# Patient Record
Sex: MALE | Race: WHITE | URBAN - METROPOLITAN AREA
[De-identification: names, ages, dates, MRNs, and addresses within clinical notes are randomized per-mention and may not be internally consistent; named-entity substitution may affect disease eponyms.]

---

## 2017-07-10 ENCOUNTER — APPOINTMENT (OUTPATIENT)
Dept: GENERAL RADIOLOGY | Facility: CLINIC | Age: 37
End: 2017-07-10
Attending: PHYSICIAN ASSISTANT
Payer: COMMERCIAL

## 2017-07-10 ENCOUNTER — HOSPITAL ENCOUNTER (EMERGENCY)
Facility: CLINIC | Age: 37
Discharge: HOME | End: 2017-07-10
Attending: PHYSICIAN ASSISTANT | Admitting: PHYSICIAN ASSISTANT
Payer: COMMERCIAL

## 2017-07-10 VITALS
WEIGHT: 220 LBS | SYSTOLIC BLOOD PRESSURE: 143 MMHG | RESPIRATION RATE: 18 BRPM | BODY MASS INDEX: 31.57 KG/M2 | OXYGEN SATURATION: 98 % | TEMPERATURE: 97.8 F | DIASTOLIC BLOOD PRESSURE: 76 MMHG

## 2017-07-10 DIAGNOSIS — S62.001A CLOSED DISPLACED FRACTURE OF SCAPHOID OF RIGHT WRIST, UNSPECIFIED PORTION OF SCAPHOID, INITIAL ENCOUNTER: ICD-10-CM

## 2017-07-10 PROCEDURE — 99213 OFFICE O/P EST LOW 20 MIN: CPT

## 2017-07-10 PROCEDURE — 73110 X-RAY EXAM OF WRIST: CPT | Mod: RT

## 2017-07-10 PROCEDURE — 99213 OFFICE O/P EST LOW 20 MIN: CPT | Performed by: PHYSICIAN ASSISTANT

## 2017-07-10 NOTE — ED PROVIDER NOTES
History     Chief Complaint   Patient presents with     Wrist Pain     fell on Sat, still having pain     HPI  Michael Arreola is a 37 year old male who sustained a right wrist injury 3 days ago.   Mechanism of injury: contusion. Patient was getting out of a boat and slipped on some hart covered rocks landing on right wrist.   Immediate symptoms: immediate pain, immediate swelling, was able to use arm directly after injury, was able to use hand directly after injury, no deformity was noted by the patient.   Symptoms have been sudden, unchanged since that time.   Prior history of related problems: no prior problems with this area in the past. Patient broke left scaphoid bone in the past.     Patient denies numbness, abrasions/lacerations, weakness in the wrist or hand. No elbow pain. No bruising.   Positive swelling to right hand with tingling noted to hand.   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     Problem list, Medication list, Allergies, and Medical/Social/Surgical histories reviewed in [facility] and updated as appropriate.    Review of Systems     All normal unless stated above     Physical Exam   BP: 143/76  Heart Rate: 91  Temp: 97.8  F (36.6  C)  Resp: 18  Weight: 99.8 kg (220 lb)  SpO2: 98 %  Physical Exam     Constitutional: healthy, alert and no distress   Cardiovascular: RRR. No murmurs, clicks gallops or rub  Respiratory: Lungs clear to auscultation bilaterally. No rales, rhonchi, wheezing appreciated. No accessory muscle use or retractions.   NEURO: Gait normal. Reflexes normal and symmetric. Sensation grossly WNL.  SKIN: no suspicious lesions or rashes  JOINT/EXTREMITIES: extremities normal- no gross deformities noted, normal muscle tone, mild edema to the right hand and wrist, peripheral pulses normal, decreased range of motion in right hand and wrist due to swelling and pain in snuff box region, and tender to palpation over the scaphoid bone.     No results found for this or any  previous visit (from the past 24 hour(s)).    Patient states he has the exact same universal thumb splint at home for the right wrist and declined our splint in the office today. Patient was informed to go home and put splint on right away and keep it on until seen by ortho. Patient aware and understands.     ED Course     ED Course     Procedures            Critical Care time:  none               Labs Ordered and Resulted from Time of ED Arrival Up to the Time of Departure from the ED - No data to display    Assessments & Plan (with Medical Decision Making)     I have reviewed the nursing notes.    I have reviewed the findings, diagnosis, plan and need for follow up with the patient.       Discharge Medication List as of 7/10/2017  1:51 PM          Final diagnoses:   Closed displaced fracture of scaphoid of right wrist, unspecified portion of scaphoid, initial encounter - minimally displaced       7/10/2017   [facility] EMERGENCY DEPARTMENT     Luz Elena Sorenson PA-C  07/10/17 1352       Luz Elena Sorenson PA-C  07/10/17 1400

## 2017-07-10 NOTE — LETTER
[facility] EMERGENCY DEPARTMENT  [address] [address]  [phone]  Dept: [phone]      7/10/2017    Re: Michael TRAY Arreola      TO WHOM IT MAY CONCERN:    Michael Arreola  was seen on 7/10/17.  Please excuse him from work until seen by orthopedic specialist due to injury.    Cordially    Luz Elena Sorenson PA-C  [facility] EMERGENCY DEPARTMENT

## 2017-07-10 NOTE — DISCHARGE INSTRUCTIONS
Rest and elevate the affected painful area as much as possible.    Apply ice for 15-20 minutes intermittently as needed and especially after any offending activity.   Use splint/crutches as directed  Daily stretching to prevent stiffness of joint if no diagnosed fracture diagnosed.  As pain recedes, begin normal activities slowly as tolerated.    Anti-inflammatories/pain relievers like tylenol/acetaminophen or ibuprofen can be very helpful if not allergic to or contraindicated.       Follow up with Orthopedic specialist for further evaluation and treatment; referral given

## 2017-07-10 NOTE — ED NOTES
Patient here for pain in the right wrist, symptoms started Saturday [place].  Patient presents ambulatory to the urgent care.

## 2017-07-10 NOTE — ED AVS SNAPSHOT
[facility] Emergency Department    [address] [address]    Phone:  [phone]    Fax:  [phone]                                       Michael Arreola   MRN: [id number]    Department:  [facility] Emergency Department   Date of Visit:  7/10/2017           Patient Information     Date Of Birth          1980        Your diagnoses for this visit were:     Closed displaced fracture of scaphoid of right wrist, unspecified portion of scaphoid, initial encounter minimally displaced       You were seen by Luz Elena Sorenson PA-C.        Discharge Instructions       Rest and elevate the affected painful area as much as possible.    Apply ice for 15-20 minutes intermittently as needed and especially after any offending activity.   Use splint/crutches as directed  Daily stretching to prevent stiffness of joint if no diagnosed fracture diagnosed.  As pain recedes, begin normal activities slowly as tolerated.    Anti-inflammatories/pain relievers like tylenol/acetaminophen or ibuprofen can be very helpful if not allergic to or contraindicated.       Follow up with Orthopedic specialist for further evaluation and treatment; referral given           24 Hour Appointment Hotline       To make an appointment at any [facility] clinic, call [phone] ([phone]). If you don't have a family doctor or clinic, we will help you find one. [facility] clinics are conveniently located to serve the needs of you and your family.          ED Discharge Orders     ORTHO  REFERRAL       [facility] is referring you to the Orthopedic  Services at [facility].       The [facility] Representative will assist you in the coordination of your Orthopedic and Musculoskeletal Care as prescribed by your physician.    The  Representative will call you within 1 business day to help schedule your appointment, or you may contact the [facility]  Representative at:    All areas ~ [phone]     Type of Referral : Non Surgical       Timeframe requested: 3 - 5 days    Coverage of these services is subject to the terms and limitations of your health insurance plan.  Please call member services at your health plan with any benefit or coverage questions.      If X-rays, CT or MRI's have been performed, please contact the facility where they were done to arrange for , prior to your scheduled appointment.  Please bring this referral request to your appointment and present it to your specialist.            Titan Wrist/Thumb Universal       Right                     Review of your medicines      Our records show that you are taking the medicines listed below. If these are incorrect, please call your family doctor or clinic.        Dose / Directions Last dose taken    omeprazole 20 MG CR capsule   Commonly known as:  priLOSEC   Dose:  20 mg   Quantity:  30 capsule        Take 1 capsule (20 mg) by mouth daily   Refills:  11                Procedures and tests performed during your visit     Wrist XR, G/E 3 views, right      Orders Needing Specimen Collection     None      Pending Results     No orders found from 7/8/2017 to 7/11/2017.            Pending Culture Results     No orders found from 7/8/2017 to 7/11/2017.            Pending Results Instructions     If you had any lab results that were not finalized at the time of your Discharge, you can call the ED Lab Result RN at [phone]. You will be contacted by this team for any positive Lab results or changes in treatment. The nurses are available 7 days a week from 10A to 6:30P.  You can leave a message 24 hours per day and they will return your call.        Test Results From Your Hospital Stay        7/10/2017  1:41 PM      Narrative     XR WRIST RT G/E 3 VW 7/10/2017 1:19 PM    COMPARISON: None.    HISTORY: Fall onto wrist with pain at the scaphoid.        Impression     IMPRESSION: There appears to  "be a minimally displaced fracture of the  mid scaphoid, seen on the oblique and scaphoid projections. No other  fractures are seen in the right wrist. Joint spaces are preserved and  in normal alignment.    LUIS BUSTOS                Thank you for choosing [facility]       Thank you for choosing [facility] for your care. Our goal is always to provide you with excellent care. Hearing back from our patients is one way we can continue to improve our services. Please take a few minutes to complete the written survey that you may receive in the mail after you visit with us. Thank you!        Lasso Information     Lasso lets you send messages to your doctor, view your test results, renew your prescriptions, schedule appointments and more. To sign up, go to [email or web address] . Click on \"Log in\" on the left side of the screen, which will take you to the Welcome page. Then click on \"Sign up Now\" on the right side of the page.     You will be asked to enter the access code listed below, as well as some personal information. Please follow the directions to create your username and password.     Your access code is: [email or web address]  Expires: 10/8/2017  1:50 PM     Your access code will  in 90 days. If you need help or a new code, please call your [facility] clinic or [phone].        Care EveryWhere ID     This is your Care EveryWhere ID. This could be used by other organizations to access your [facility] medical records  [id number]        Equal Access to Services     TONNY ALEJANDRA : Hadii scotty mariono Sokris, waaxda luqadaha, qaybta kaalmada adeegyada, johnna vicente . So [facility] [phone].    ATENCIÓN: Si habla español, tiene a reaves disposición servicios gratuitos de asistencia lingüística. Llame al [phone].    We comply with applicable federal civil rights laws and [place] laws. We do not discriminate on the basis of race, color, national origin, age, disability sex, sexual " orientation or gender identity.            After Visit Summary       This is your record. Keep this with you and show to your community pharmacist(s) and doctor(s) at your next visit.

## 2017-07-10 NOTE — ED AVS SNAPSHOT
[facility] Emergency Department    [address] [address]    Phone:  [phone]    Fax:  [phone]                                       Michael Arreola   MRN: [id number]    Department:  [facility] Emergency Department   Date of Visit:  7/10/2017           After Visit Summary Signature Page     I have received my discharge instructions, and my questions have been answered. I have discussed any challenges I see with this plan with the nurse or doctor.    ..........................................................................................................................................  Patient/Patient Representative Signature      ..........................................................................................................................................  Patient Representative Print Name and Relationship to Patient    ..................................................               ................................................  Date                                            Time    ..........................................................................................................................................  Reviewed by Signature/Title    ...................................................              ..............................................  Date                                                            Time

## 2017-07-11 ENCOUNTER — OFFICE VISIT (OUTPATIENT)
Dept: ORTHOPEDICS | Facility: CLINIC | Age: 37
End: 2017-07-11
Payer: COMMERCIAL

## 2017-07-11 VITALS — WEIGHT: 215 LBS | HEART RATE: 72 BPM | BODY MASS INDEX: 30.78 KG/M2 | HEIGHT: 70 IN

## 2017-07-11 DIAGNOSIS — S62.024A CLOSED NONDISPLACED FRACTURE OF MIDDLE THIRD OF SCAPHOID BONE OF RIGHT WRIST, INITIAL ENCOUNTER: Primary | ICD-10-CM

## 2017-07-11 PROCEDURE — 29075 APPL CST ELBW FNGR SHORT ARM: CPT | Performed by: PHYSICAL MEDICINE & REHABILITATION

## 2017-07-11 PROCEDURE — 99244 OFF/OP CNSLTJ NEW/EST MOD 40: CPT | Mod: 25 | Performed by: PHYSICAL MEDICINE & REHABILITATION

## 2017-07-11 NOTE — MR AVS SNAPSHOT
After Visit Summary   2017    Michael Arreola    MRN: [id number]           Patient Information     Date Of Birth          1980        Visit Information        Provider Department      2017 2:00 PM Simi Baugh MD [facility] [employer] [facility]        Today's Diagnoses     Closed nondisplaced fracture of middle third of scaphoid bone of right wrist, initial encounter    -  1      Care Instructions    Today's Plan of Care:  -Cast care as outlined below  -Schedule CT - Advanced Imaging [phone]. Come in before imaging appt for cast removal    Follow Up: 6 weeks or sooner if symptoms fail to improve or worsen. Call with any questions or concerns.          Caring for Your Cast     A cast is used to protect an injured body part and allow it to heal by limiting the amount of motion occurring around the injury. Pain and swelling of the injured area is normal for 48 hours after your cast is put on. If you have swelling, wiggle your toes or fingers to ease it. Doing so encourages blood flow to your arm or leg.     It is important that you keep your cast dry, unless your doctor tells you differently. If the padding of the cast gets wet, your skin may be damaged and become infected. When showering or taking a bath, put the cast in a heavy plastic bag that can be held in place with a rubber band. If your cast gets wet and does not dry out in four to five hours, call your doctor s office.   To keep the cast clean, use wash clothes or baby wipes around it.   You may experience some itching inside the cast. This is normal. Avoid putting anything in the cast, even your finger, as you can injure your skin and cause infection. Try shaking some talcum powder or blowing cool air from a hair dryer into the cast to ease itching.   If these signs or symptoms develop, call your doctor immediately.       Pain gets worse     Swelling that cuts off blood flow that does not go away, even when  "you lift the body part above the level of your heart     Fever after itching. It may be related to an infection.     Fluid draining from your skin under the cast     Your cast may become loose as swelling goes down. If the cast feels too loose or if it is so loose you can take it off, call your doctor s office.     Your doctor or  will give you recommendations for activity based on your injury. Some sports allow casts if properly padded by a doctor or .     For complete healing, your cast should only be removed at the direction of your doctor or clinic staff. A special saw ensures its safe removal and protects the skin and other tissue under the cast.             Follow-ups after your visit        Future tests that were ordered for you today     Open Future Orders        Priority Expected Expires Ordered    CT Wrist Right w/o Contrast Routine  7/11/2018 7/11/2017            Who to contact     If you have questions or need follow up information about today's clinic visit or your schedule please contact [facility] directly at [phone].  Normal or non-critical lab and imaging results will be communicated to you by ViaBillhart, letter or phone within 4 business days after the clinic has received the results. If you do not hear from us within 7 days, please contact the clinic through ViaBillhart or phone. If you have a critical or abnormal lab result, we will notify you by phone as soon as possible.  Submit refill requests through [facility]mark or call your pharmacy and they will forward the refill request to us. Please allow 3 business days for your refill to be completed.          Additional Information About Your Visit        [facility] Information     [facility] lets you send messages to your doctor, view your test results, renew your prescriptions, schedule appointments and more. To sign up, go to [email or web address] . Click on \"Log in\" on the left side of the screen, which will " "take you to the Welcome page. Then click on \"Sign up Now\" on the right side of the page.     You will be asked to enter the access code listed below, as well as some personal information. Please follow the directions to create your username and password.     Your access code is: [email or web address]  Expires: 10/8/2017  1:50 PM     Your access code will  in 90 days. If you need help or a new code, please call your [facility] clinic or [phone].        Care EveryWhere ID     This is your Care EveryWhere ID. This could be used by other organizations to access your [facility] medical records  [id number]        Your Vitals Were     Pulse Height BMI (Body Mass Index)             72 5' 9.69\" (1.77 m) 31.13 kg/m2          Blood Pressure from Last 3 Encounters:   07/10/17 143/76   14 131/85   09 118/84    Weight from Last 3 Encounters:   17 215 lb (97.5 kg)   07/10/17 220 lb (99.8 kg)   14 209 lb 12.8 oz (95.2 kg)               Primary Care Provider    None       No address on file        Equal Access to Services     TONNY ALEJANDRA AH: Hadii scotty mariono Sokris, waaxda luqadaha, qaybta kaalmada adeegyada, johnna hardy. So [facility] [phone].    ATENCIÓN: Si habla español, tiene a reaves disposición servicios gratuitos de asistencia lingüística. Merryame al [phone].    We comply with applicable federal civil rights laws and [place] laws. We do not discriminate on the basis of race, color, national origin, age, disability sex, sexual orientation or gender identity.            Thank you!     Thank you for choosing [facility]  for your care. Our goal is always to provide you with excellent care. Hearing back from our patients is one way we can continue to improve our services. Please take a few minutes to complete the written survey that you may receive in the mail after your visit with us. Thank you!             Your Updated Medication List - Protect others " around you: Learn how to safely use, store and throw away your medicines at www.disposemymeds.org.          This list is accurate as of: 7/11/17  2:36 PM.  Always use your most recent med list.                   Brand Name Dispense Instructions for use Diagnosis    omeprazole 20 MG CR capsule    priLOSEC    30 capsule    Take 1 capsule (20 mg) by mouth daily    GERD (gastroesophageal reflux disease)

## 2017-07-11 NOTE — PATIENT INSTRUCTIONS
Today's Plan of Care:  -Cast care as outlined below  -Schedule CT - Advanced Imaging [phone]. Come in before imaging appt for cast removal    Follow Up: 6 weeks or sooner if symptoms fail to improve or worsen. Call with any questions or concerns.          Caring for Your Cast     A cast is used to protect an injured body part and allow it to heal by limiting the amount of motion occurring around the injury. Pain and swelling of the injured area is normal for 48 hours after your cast is put on. If you have swelling, wiggle your toes or fingers to ease it. Doing so encourages blood flow to your arm or leg.     It is important that you keep your cast dry, unless your doctor tells you differently. If the padding of the cast gets wet, your skin may be damaged and become infected. When showering or taking a bath, put the cast in a heavy plastic bag that can be held in place with a rubber band. If your cast gets wet and does not dry out in four to five hours, call your doctor s office.   To keep the cast clean, use wash clothes or baby wipes around it.   You may experience some itching inside the cast. This is normal. Avoid putting anything in the cast, even your finger, as you can injure your skin and cause infection. Try shaking some talcum powder or blowing cool air from a hair dryer into the cast to ease itching.   If these signs or symptoms develop, call your doctor immediately.       Pain gets worse     Swelling that cuts off blood flow that does not go away, even when you lift the body part above the level of your heart     Fever after itching. It may be related to an infection.     Fluid draining from your skin under the cast     Your cast may become loose as swelling goes down. If the cast feels too loose or if it is so loose you can take it off, call your doctor s office.     Your doctor or  will give you recommendations for activity based on your injury. Some sports allow casts  if properly padded by a doctor or .     For complete healing, your cast should only be removed at the direction of your doctor or clinic staff. A special saw ensures its safe removal and protects the skin and other tissue under the cast.

## 2017-07-11 NOTE — PROGRESS NOTES
Sports Medicine Clinic Visit    PCP: None    CC: Patient presents with:  Wrist right: scaphoid fracture      HPI:  Michael Arreola is a 37 year old male who is seen in consultation at the request of Luz Elena Sorenson PA-C (ED).   He notes an injury on 7/8/17 when he was getting out of a boat and slipped on some hart covered rocks landing on right wrist. Pain is located on the right thumb.  He rates the pain at a  8/10 at its worst and a 0/10 currently.  Symptoms are relieved with tylenol and rest and worsened by us of thumb and twisting of the wrist. He endorses swelling, bruising, tingling and weakness and denies popping, grinding, catching, locking, instability, numbness, pain in other joints and fever, chills.  Other treatment has included bracing (EXOS). He notes difficulty with use of the thumb.     Review of Systems:  Musculoskeletal: as above  Remainder of review of systems is negative including constitutional, eyes, ENT, CV, pulmonary, GI, , endocrine, skin, hematologic, and neurologic except as noted in HPI or medical history.    History reviewed. History of left scaphoid fracture which was displaced and required surgery x 2.  He was a smoker at the time (quit in 2005) and required a bone growth stimulator to assist with healing.    Family History   Problem Relation Age of Onset     Alcohol/Drug Brother      Alcohol/Drug Brother      Social History     Social History     Marital status:      Spouse name: N/A     Number of children: N/A     Years of education: N/A     Occupational History     Not on file.     Social History Main Topics     Smoking status: Former Smoker     Packs/day: 1.00     Years: 15.00     Types: Cigarettes     Quit date: 12/10/2005     Smokeless tobacco: Never Used     Alcohol use 2.0 oz/week     4 drink(s) per week      Comment: occasionally     Drug use: No     Sexual activity: Yes     Partners: Female     Birth control/ protection: Condom     Other Topics Concern      "Parent/Sibling W/ Cabg, Mi Or Angioplasty Before 65f 55m? No     Social History Narrative       He works as a carpentar  At baseline, he does not need assistance with ambulation      Current Outpatient Prescriptions   Medication     omeprazole (PRILOSEC) 20 MG capsule     No current facility-administered medications for this visit.      Allergies   Allergen Reactions     Codeine Nausea and Vomiting         Objective:  Pulse 72  Ht 5' 9.69\" (1.77 m)  Wt 215 lb (97.5 kg)  BMI 31.13 kg/m2    General: Alert and in no distress    Head: Normocephalic, atraumatic  Eyes: no scleral icterus or conjunctival erythema   Oropharynx:  Mucous membranes moist  Skin: no erythema, ecchymosis, petechiae, or jaundice  CV: regular rhythm by palpation, 2+ distal pulses  Resp: normal respiratory effort without conversational dyspnea   Psych: normal mood and affect    Gait: Non-antalgic, appropriate coordination and balance   Neuro: Motor strength and sensation as noted below    Musculoskeletal:    Bilateral Wrist and Hand exam    Inspection:       -No swelling, bruising or deformity bilaterally       -Healed left wrist surgical scar    Palpation:  Tender to palpation over the right anatomical snuffbox    ROM:       Full and symmetric active and passive range of motion of the forearm, wrist and digits bilaterally    Strength:       5/5 strength in the muscles of the hand, wrist and forearm bilaterally except for right  strength which is 5-/5.      Neurovascular:       2+ radial pulses bilaterally and normal sensation to light touch in the radial, median and ulnar nerve distributions    Radiology:  Independent visualization of images performed and reviewed with patient.      Recent Results (from the past 744 hour(s))   Wrist XR, G/E 3 views, right    Narrative    XR WRIST RT G/E 3 VW 7/10/2017 1:19 PM    COMPARISON: None.    HISTORY: Fall onto wrist with pain at the scaphoid.      Impression    IMPRESSION: There appears to be a " minimally displaced fracture of the  mid scaphoid, seen on the oblique and scaphoid projections. No other  fractures are seen in the right wrist. Joint spaces are preserved and  in normal alignment.    LUIS BUSTOS       Assessment:  1. Closed nondisplaced fracture of middle third of scaphoid bone of right wrist, initial encounter        Plan:  Discussed the assessment with the patient. We discussed the following treatment options: symptom treatment, activity modification/rest, imaging and rehab. Following discussion, plan:  -Discussed with Michael the tenuous blood supply of the scaphoid and that it often takes longer to heal, the immobilization time is longer, and the frequency of nonunion of higher than in other bones.  The options would be referral to a hand surgeon for possible surgery or conservative care with immobilization.  Unlike this previous scaphoid fracture which was likely unstable due to displacement, his current scaphoid fracture is not displaced and therefore has a good chance of healing with prolonged immobilization.  He would like to proceed with the conservative route.  We placed him in a short arm thumb spica cast, which he will remain in for six weeks.  After six weeks, we will remove it and obtain a CT scan to assess healing.  Depending on healing, we may need to put in back in a cast.  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  In general, he should only be doing very light activity with the right hand/arm.  All questions invited and answered.      Zohra Baugh MD, [facility]

## 2017-07-11 NOTE — NURSING NOTE
A thumb spica cast was applied to patient.  Cast care instructions reviewed and given to patient.  Patient was advised to call if he has any questions or concerns.     Distal circulation intact post-cast/splint application.

## 2017-07-11 NOTE — LETTER
[address] [address]  [phone]      July 11, 2017    Michael Arreola  [facility] [place] [id number]              To whom it may concern,  Michael was seen in clinic today for a hand injury. He is able to return to work with the following restrictions:    -No use of the right hand    He will be seen in follow up in 6 weeks for reevaluation. Thank you for your help in advance.            Sincerely,      Simi Baugh MD

## 2017-08-02 ENCOUNTER — OFFICE VISIT (OUTPATIENT)
Dept: FAMILY MEDICINE | Facility: CLINIC | Age: 37
End: 2017-08-02
Payer: COMMERCIAL

## 2017-08-02 VITALS
WEIGHT: 216.6 LBS | TEMPERATURE: 98.3 F | RESPIRATION RATE: 20 BRPM | BODY MASS INDEX: 31.36 KG/M2 | SYSTOLIC BLOOD PRESSURE: 120 MMHG | DIASTOLIC BLOOD PRESSURE: 86 MMHG | HEART RATE: 80 BPM

## 2017-08-02 DIAGNOSIS — S62.024D CLOSED NONDISPLACED FRACTURE OF MIDDLE THIRD OF SCAPHOID OF RIGHT WRIST WITH ROUTINE HEALING, SUBSEQUENT ENCOUNTER: Primary | ICD-10-CM

## 2017-08-02 PROCEDURE — 99212 OFFICE O/P EST SF 10 MIN: CPT | Performed by: PHYSICIAN ASSISTANT

## 2017-08-02 NOTE — MR AVS SNAPSHOT
"              After Visit Summary   2017    Michael Arreola    MRN: [id number]           Patient Information     Date Of Birth          1980        Visit Information        Provider Department      2017 1:20 PM Derian Read PA-C [facility]        Today's Diagnoses     Closed nondisplaced fracture of middle third of scaphoid of right wrist with routine healing, subsequent encounter    -  1       Follow-ups after your visit        Who to contact     If you have questions or need follow up information about today's clinic visit or your schedule please contact [facility] directly at [phone].  Normal or non-critical lab and imaging results will be communicated to you by LonoCloudhart, letter or phone within 4 business days after the clinic has received the results. If you do not hear from us within 7 days, please contact the clinic through LonoCloudhart or phone. If you have a critical or abnormal lab result, we will notify you by phone as soon as possible.  Submit refill requests through [employer] or call your pharmacy and they will forward the refill request to us. Please allow 3 business days for your refill to be completed.          Additional Information About Your Visit        MyChart Information     En[employer] lets you send messages to your doctor, view your test results, renew your prescriptions, schedule appointments and more. To sign up, go to [email or web address] . Click on \"Log in\" on the left side of the screen, which will take you to the Welcome page. Then click on \"Sign up Now\" on the right side of the page.     You will be asked to enter the access code listed below, as well as some personal information. Please follow the directions to create your username and password.     Your access code is: [email or web address]  Expires: 10/8/2017  1:50 PM     Your access code will  in 90 days. If you need help or a new code, please call your [place] clinic or [phone].   "      Care EveryWhere ID     This is your Care EveryWhere ID. This could be used by other organizations to access your [facility] medical records  [id number]        Your Vitals Were     Pulse Temperature Respirations BMI (Body Mass Index)          80 98.3  F (36.8  C) (Tympanic) 20 31.36 kg/m2         Blood Pressure from Last 3 Encounters:   08/02/17 120/86   07/10/17 143/76   09/03/14 131/85    Weight from Last 3 Encounters:   08/02/17 216 lb 9.6 oz (98.2 kg)   07/11/17 215 lb (97.5 kg)   07/10/17 220 lb (99.8 kg)              Today, you had the following     No orders found for display       Primary Care Provider    None       No address on file        Equal Access to Services     TONNY ALEJANDRA : Hadii scotty Clark, waaxda luqadaha, qaybta kaalmada adepujayaamish, johnna vicente . So [facility] [phone].    ATENCIÓN: Si habla español, tiene a reaves disposición servicios gratuitos de asistencia lingüística. Llame al [phone].    We comply with applicable federal civil rights laws and [place] laws. We do not discriminate on the basis of race, color, national origin, age, disability sex, sexual orientation or gender identity.            Thank you!     Thank you for choosing [facility]  for your care. Our goal is always to provide you with excellent care. Hearing back from our patients is one way we can continue to improve our services. Please take a few minutes to complete the written survey that you may receive in the mail after your visit with us. Thank you!             Your Updated Medication List - Protect others around you: Learn how to safely use, store and throw away your medicines at www.disposemymeds.org.          This list is accurate as of: 8/2/17  3:37 PM.  Always use your most recent med list.                   Brand Name Dispense Instructions for use Diagnosis    omeprazole 20 MG CR capsule    priLOSEC    30 capsule    Take 1 capsule (20 mg) by mouth  daily    GERD (gastroesophageal reflux disease)

## 2017-08-02 NOTE — PROGRESS NOTES
HPI    SUBJECTIVE:                                                    Michael Arreola is a 37 year old male who presents to clinic today for concerns regarding his cast on his wrist. He has a thumb spica cast for a scaphoid fracture and states that it feels loose. Probableas documented    Cast Change       Duration: 3 weeks    Description (location/character/radiation): Patient states that he has had his current cast for 3 weeks. In clinic today because he needs a new cast     Intensity:  mild    Accompanying signs and symptoms: none    History (similar episodes/previous evaluation): None    Precipitating or alleviating factors: None    Therapies tried and outcome: None     Problem list and histories reviewed & adjusted, as indicated.  Additional history: as documented    Patient Active Problem List   Diagnosis     CARDIOVASCULAR SCREENING; LDL GOAL LESS THAN 160     GERD (gastroesophageal reflux disease)     Dermatitis     Shingles     History reviewed. No pertinent surgical history.    Social History   Substance Use Topics     Smoking status: Former Smoker     Packs/day: 1.00     Years: 15.00     Types: Cigarettes     Quit date: 12/10/2005     Smokeless tobacco: Never Used     Alcohol use 2.0 oz/week     4 Standard drinks or equivalent per week      Comment: occasionally     Family History   Problem Relation Age of Onset     Alcohol/Drug Brother      Alcohol/Drug Brother          Current Outpatient Prescriptions   Medication Sig Dispense Refill     omeprazole (PRILOSEC) 20 MG capsule Take 1 capsule (20 mg) by mouth daily 30 capsule 11     Allergies   Allergen Reactions     Codeine Nausea and Vomiting     Labs reviewed in EPIC      Reviewed and updated as needed this visit by clinical staff     Reviewed and updated as needed this visit by Provider     ROS  He states that it feels as though his wrist can move inside the cast.  He denies any numbness tingling or pain associated with this.    Physical Exam    Cast was  examined and is in very good repair. The cast appears to be secure and there is minimal loosening. Neurovascular status intact.        (M65.340P) Closed nondisplaced fracture of middle third of scaphoid of right wrist with routine healing, subsequent encounter  (primary encounter diagnosis)  Comment:   Plan:     I recommended keeping the current cast at this time because it is fitting fairly rather well and has a very good cast.  Follow-up with sports medicine in 3 weeks

## 2017-08-02 NOTE — NURSING NOTE
"Chief Complaint   Patient presents with     Cast Change       Initial /86 (BP Location: Right arm, Patient Position: Chair, Cuff Size: Adult Large)  Pulse 80  Temp 98.3  F (36.8  C) (Tympanic)  Resp 20  Wt 216 lb 9.6 oz (98.2 kg)  BMI 31.36 kg/m2 Estimated body mass index is 31.36 kg/(m^2) as calculated from the following:    Height as of 7/11/17: 5' 9.69\" (1.77 m).    Weight as of this encounter: 216 lb 9.6 oz (98.2 kg).  Medication Reconciliation: complete    Health Maintenance that is potentially due pending provider review:  NONE    n/a    Is there anyone who you would like to be able to receive your results? No  If yes have patient fill out ADITYA    Genevieve NANCE CMA    "

## 2017-08-22 ENCOUNTER — HOSPITAL ENCOUNTER (OUTPATIENT)
Dept: CT IMAGING | Facility: CLINIC | Age: 37
Discharge: HOME | End: 2017-08-22
Attending: PHYSICAL MEDICINE & REHABILITATION | Admitting: PHYSICAL MEDICINE & REHABILITATION
Payer: COMMERCIAL

## 2017-08-22 ENCOUNTER — OFFICE VISIT (OUTPATIENT)
Dept: ORTHOPEDICS | Facility: CLINIC | Age: 37
End: 2017-08-22
Payer: COMMERCIAL

## 2017-08-22 VITALS — WEIGHT: 215 LBS | HEIGHT: 70 IN | HEART RATE: 78 BPM | BODY MASS INDEX: 30.78 KG/M2

## 2017-08-22 DIAGNOSIS — S62.024A CLOSED NONDISPLACED FRACTURE OF MIDDLE THIRD OF SCAPHOID BONE OF RIGHT WRIST, INITIAL ENCOUNTER: ICD-10-CM

## 2017-08-22 DIAGNOSIS — S62.024D CLOSED NONDISPLACED FRACTURE OF MIDDLE THIRD OF SCAPHOID OF RIGHT WRIST WITH ROUTINE HEALING, SUBSEQUENT ENCOUNTER: Primary | ICD-10-CM

## 2017-08-22 PROCEDURE — 99213 OFFICE O/P EST LOW 20 MIN: CPT | Performed by: PHYSICAL MEDICINE & REHABILITATION

## 2017-08-22 PROCEDURE — 73200 CT UPPER EXTREMITY W/O DYE: CPT | Mod: RT

## 2017-08-22 NOTE — PROGRESS NOTES
"  Sports Medicine Clinic Visit - Interim History August 22, 2017    Initial Visit Date 7/11/2017  Initial Injury Date 7/8/17    PCP: None    Michael FELIZ Maxwell is a 37 year old male who is seen in f/u up for a right thumb scaphoid fracture. Since last visit on 7/11/2017 patient has been doing well. He has been very cautious about using the hand.  He reports no irritation from the cast.  He rates the pain at a 0/10 currently.  Symptoms are relieved with immobilization and worsened by nothing at this time.     - Now ~ 7 weeks from initial injury      Review of Systems  Musculoskeletal: as above  Remainder of review of systems is negative including constitutional, eyes, ENT, CV, pulmonary, GI, , endocrine, skin, hematologic, and neurologic except as noted in HPI or medical history.    History reviewed.   History of scaphoid fracture on the left.        Family History   Problem Relation Age of Onset     Alcohol/Drug Brother      Alcohol/Drug Brother      Social History     Social History     Marital status:      Spouse name: N/A     Number of children: N/A     Years of education: N/A     Occupational History     Not on file.     Social History Main Topics     Smoking status: Former Smoker     Packs/day: 1.00     Years: 15.00     Types: Cigarettes     Quit date: 12/10/2005     Smokeless tobacco: Never Used     Alcohol use 2.0 oz/week     4 Standard drinks or equivalent per week      Comment: occasionally     Drug use: No     Sexual activity: Yes     Partners: Female     Birth control/ protection: Condom     Other Topics Concern     Parent/Sibling W/ Cabg, Mi Or Angioplasty Before 65f 55m? No     Social History Narrative       Current Outpatient Prescriptions   Medication     omeprazole (PRILOSEC) 20 MG capsule     No current facility-administered medications for this visit.      Allergies   Allergen Reactions     Codeine Nausea and Vomiting         Objective:  Ht 5' 9.69\" (1.77 m)  Wt 216 lb 9.6 oz (98.2 kg)  BMI " 31.36 kg/m2    General: Alert and in no distress    Head: Normocephalic, atraumatic  Eyes: no scleral icterus or conjunctival erythema   Oropharynx:  Mucous membranes moist  Skin: no erythema, ecchymosis, petechiae, or jaundice  CV: regular rhythm by palpation, 2+ distal pulses  Resp: normal respiratory effort without conversational dyspnea   Psych: normal mood and affect    Gait: Non-antalgic, appropriate coordination and balance   Neuro: Motor strength and sensation as noted below    Musculoskeletal:    Bilateral Wrist and Hand exam    Inspection:       No swelling, bruising or deformity bilaterally    Palpation:  No tenderness to palpation of the wrist, hands, or digitts    ROM:  -Right thumb and wrist movement somewhat stiff    Strength:       5/5 strength in the muscles of the hand, wrist and forearm bilaterally    Neurovascular:       2+ radial pulses bilaterally and normal sensation to light touch in the radial, median and ulnar nerve distributions    Radiology:  Independent visualization of images performed  XR WRIST RT G/E 3 VW 7/10/2017 1:19 PM     COMPARISON: None.     HISTORY: Fall onto wrist with pain at the scaphoid.         IMPRESSION: There appears to be a minimally displaced fracture of the  mid scaphoid, seen on the oblique and scaphoid projections. No other  fractures are seen in the right wrist. Joint spaces are preserved and  in normal alignment.     LUIS BUSTOS    Assessment:  1. Closed nondisplaced fracture of middle third of scaphoid bone of right wrist        Plan:  Discussed the assessment with the patient. We discussed the following treatment options: symptom treatment, activity modification/rest, imaging and rehab. Following discussion, plan:  -We have removed the cast and placed him in a thumb spica brace temporarily as he has a CT scheduled this morning.  We will keep him in the brace for the next week.  We will follow up with him next week in clinic to go over CT results and see how  he did in the brace for the week.  He may or may not need to be put back in a cast.  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      Zohra Baugh MD, [facility]

## 2017-08-22 NOTE — PATIENT INSTRUCTIONS
Today's Plan of Care:  Use of soft wrist brace.     Follow Up: Follow up early next week for reevaluation

## 2017-08-22 NOTE — MR AVS SNAPSHOT
After Visit Summary   8/22/2017    Michael Arreola    MRN: [id number]           Patient Information     Date Of Birth          1980        Visit Information        Provider Department      8/22/2017 8:40 AM Simi Baugh MD [facility]        Today's Diagnoses     Closed nondisplaced fracture of middle third of scaphoid bone of right wrist    -  1      Care Instructions    Today's Plan of Care:  Use of soft wrist brace.     Follow Up: Follow up early next week for reevaluation            Follow-ups after your visit        Your next 10 appointments already scheduled     Aug 22, 2017  9:15 AM CDT   CT WRIST RIGHT W/O CONTRAST with PHCT1   [facility] CT Scan ([facility])    [address] [place] [address] [phone]           Please bring any scans or X-rays taken at other hospitals, if similar tests were done. Also bring a list of your medicines, including vitamins, minerals and over-the-counter drugs. It is safest to leave personal items at home.  Be sure to tell your doctor:   If you have any allergies.   If there s any chance you are pregnant.   If you are breastfeeding.   If you have any special needs.  You do not need to do anything special to prepare.  Please wear loose clothing, such as a sweat suit or jogging clothes. Avoid snaps, zippers and other metal. We may ask you to undress and put on a hospital gown.              Who to contact     If you have questions or need follow up information about today's clinic visit or your schedule please contact [facility] directly at [phone].  Normal or non-critical lab and imaging results will be communicated to you by MyChart, letter or phone within 4 business days after the clinic has received the results. If you do not hear from us within 7 days, please contact the clinic through MyChart or phone. If you have a critical or abnormal lab result, we will notify you  "by phone as soon as possible.  Submit refill requests through [employer] or call your pharmacy and they will forward the refill request to us. Please allow 3 business days for your refill to be completed.          Additional Information About Your Visit        [employer] Information     [employer] lets you send messages to your doctor, view your test results, renew your prescriptions, schedule appointments and more. To sign up, go to [email or web address] . Click on \"Log in\" on the left side of the screen, which will take you to the Welcome page. Then click on \"Sign up Now\" on the right side of the page.     You will be asked to enter the access code listed below, as well as some personal information. Please follow the directions to create your username and password.     Your access code is: [email or web address]  Expires: 10/8/2017  1:50 PM     Your access code will  in 90 days. If you need help or a new code, please call your [place] clinic or [phone].        Care EveryWhere ID     This is your Care EveryWhere ID. This could be used by other organizations to access your [place] medical records  [id number]        Your Vitals Were     Pulse Height BMI (Body Mass Index)             78 5' 9.69\" (1.77 m) 31.12 kg/m2          Blood Pressure from Last 3 Encounters:   17 120/86   07/10/17 143/76   14 131/85    Weight from Last 3 Encounters:   17 215 lb (97.5 kg)   17 216 lb 9.6 oz (98.2 kg)   17 215 lb (97.5 kg)              Today, you had the following     No orders found for display       Primary Care Provider    None       No address on file        Equal Access to Services     SONAL ALEJANDRA : Hadii scotty Clark, jose gregg, nan quezadaalmaamish wesley, johnna hardy. So [facility] [phone].    ATENCIÓN: Si habla español, tiene a reaves disposición servicios gratuitos de asistencia lingüística. Llame al [phone].    We comply with applicable federal " civil rights laws and [place] laws. We do not discriminate on the basis of race, color, national origin, age, disability sex, sexual orientation or gender identity.            Thank you!     Thank you for choosing [facility]  for your care. Our goal is always to provide you with excellent care. Hearing back from our patients is one way we can continue to improve our services. Please take a few minutes to complete the written survey that you may receive in the mail after your visit with us. Thank you!             Your Updated Medication List - Protect others around you: Learn how to safely use, store and throw away your medicines at www.disposemymeds.org.          This list is accurate as of: 8/22/17  8:57 AM.  Always use your most recent med list.                   Brand Name Dispense Instructions for use Diagnosis    omeprazole 20 MG CR capsule    priLOSEC    30 capsule    Take 1 capsule (20 mg) by mouth daily    GERD (gastroesophageal reflux disease)

## 2017-08-28 ENCOUNTER — OFFICE VISIT (OUTPATIENT)
Dept: ORTHOPEDICS | Facility: CLINIC | Age: 37
End: 2017-08-28
Payer: COMMERCIAL

## 2017-08-28 VITALS
BODY MASS INDEX: 30.78 KG/M2 | SYSTOLIC BLOOD PRESSURE: 135 MMHG | HEIGHT: 70 IN | DIASTOLIC BLOOD PRESSURE: 100 MMHG | WEIGHT: 215 LBS

## 2017-08-28 DIAGNOSIS — S62.024D CLOSED NONDISPLACED FRACTURE OF MIDDLE THIRD OF SCAPHOID OF RIGHT WRIST WITH ROUTINE HEALING, SUBSEQUENT ENCOUNTER: Primary | ICD-10-CM

## 2017-08-28 PROCEDURE — 99213 OFFICE O/P EST LOW 20 MIN: CPT | Performed by: PHYSICAL MEDICINE & REHABILITATION

## 2017-08-28 NOTE — PATIENT INSTRUCTIONS
Today's Plan of Care:  -Return to work without restrictions. Letter provided  -Home Exercises. Please do 5-6 days of exercises per week   -Discontinue use of the brace  -Ice 15-20 minutes for pain relief or swelling as needed  -Ibuprofen (Advil) maximum of 800mg every 4-6 hours with food as needed for pain.    Follow Up: As needed if pain returns. Call with any questions or concerns.

## 2017-08-28 NOTE — LETTER
August 28, 2017      Michael Arreola  [address] [place] [address]                  To Whom It May Concern:        Michael Arreola  was seen on 8/28/17 in follow up.  He is able to return to work without restrictions. He will follow up only as needed. Thank you for your help in advance.             Sincerely,        Simi Baugh MD

## 2017-08-28 NOTE — MR AVS SNAPSHOT
"              After Visit Summary   8/28/2017    Michael Arreola    MRN: [id number]           Patient Information     Date Of Birth          1980        Visit Information        Provider Department      8/28/2017 8:40 AM Simi Baugh MD [facility]        Today's Diagnoses     Closed nondisplaced fracture of middle third of scaphoid of right wrist with routine healing, subsequent encounter    -  1      Care Instructions    Today's Plan of Care:  -Return to work without restrictions. Letter provided  -Home Exercises. Please do 5-6 days of exercises per week   -Discontinue use of the brace  -Ice 15-20 minutes for pain relief or swelling as needed  -Ibuprofen (Advil) maximum of 800mg every 4-6 hours with food as needed for pain.    Follow Up: As needed if pain returns. Call with any questions or concerns.               Follow-ups after your visit        Who to contact     If you have questions or need follow up information about today's clinic visit or your schedule please contact [facility] directly at [phone].  Normal or non-critical lab and imaging results will be communicated to you by MyChart, letter or phone within 4 business days after the clinic has received the results. If you do not hear from us within 7 days, please contact the clinic through [facility] or phone. If you have a critical or abnormal lab result, we will notify you by phone as soon as possible.  Submit refill requests through [employer] or call your pharmacy and they will forward the refill request to us. Please allow 3 business days for your refill to be completed.          Additional Information About Your Visit        [employer] English Information     PO-MO lets you send messages to your doctor, view your test results, renew your prescriptions, schedule appointments and more. To sign up, go to [email or web address] . Click on \"Log in\" on the left side of the screen, which will take you to the Welcome " "page. Then click on \"Sign up Now\" on the right side of the page.     You will be asked to enter the access code listed below, as well as some personal information. Please follow the directions to create your username and password.     Your access code is: [email or web address]  Expires: 10/8/2017  1:50 PM     Your access code will  in 90 days. If you need help or a new code, please call your [facility] clinic or [phone].        Care EveryWhere ID     This is your Care EveryWhere ID. This could be used by other organizations to access your [facility] medical records  [id number]        Your Vitals Were     Height BMI (Body Mass Index)                5' 9.69\" (1.77 m) 31.12 kg/m2           Blood Pressure from Last 3 Encounters:   17 (!) 145/101   17 120/86   07/10/17 143/76    Weight from Last 3 Encounters:   17 215 lb (97.5 kg)   17 215 lb (97.5 kg)   17 216 lb 9.6 oz (98.2 kg)              Today, you had the following     No orders found for display       Primary Care Provider    None       No address on file        Equal Access to Services     SONAL ALEJANDRA : Hadii scotty mariono Sooscarali, waaxda luqadaha, qaybta kaalmada adeegyada, johnna serrain milena vicente . So [facility] [phone].    ATENCIÓN: Si habla español, tiene a reaves disposición servicios gratuitos de asistencia lingüística. Llame al [phone].    We comply with applicable federal civil rights laws and [place] laws. We do not discriminate on the basis of race, color, national origin, age, disability sex, sexual orientation or gender identity.            Thank you!     Thank you for choosing [facility]  for your care. Our goal is always to provide you with excellent care. Hearing back from our patients is one way we can continue to improve our services. Please take a few minutes to complete the written survey that you may receive in the mail after your visit with us. Thank you!             Your " Updated Medication List - Protect others around you: Learn how to safely use, store and throw away your medicines at www.disposemymeds.org.          This list is accurate as of: 8/28/17  9:08 AM.  Always use your most recent med list.                   Brand Name Dispense Instructions for use Diagnosis    omeprazole 20 MG CR capsule    priLOSEC    30 capsule    Take 1 capsule (20 mg) by mouth daily    GERD (gastroesophageal reflux disease)

## 2017-08-28 NOTE — PROGRESS NOTES
"Sports Medicine Clinic Visit - Interim History August 28, 2017    Initial Visit Date 7/11/2017  Initial Injury Date 7/8/17    PCP: None    Michael FELIZ Maxwell is a 37 year old male who is seen in f/u up for a scaphoid fracture.  Since last visit on 8/22/2017 patient has had no issues. He reports no pain in the brace.  He rates the pain at a  0/10 currently.  Symptoms are relieved with immobilization and worsened by nothing at this time.     - Now ~ 7 weeks from initial injury      Review of Systems  Musculoskeletal: as above  Remainder of review of systems is negative including constitutional, eyes, ENT, CV, pulmonary, GI, , endocrine, skin, hematologic, and neurologic except as noted in HPI or medical history.    History reviewed. Previous history of left scaphoid fracture requiring surgery.    Family History   Problem Relation Age of Onset     Alcohol/Drug Brother      Alcohol/Drug Brother      Social History     Social History     Marital status:      Spouse name: N/A     Number of children: N/A     Years of education: N/A     Occupational History     Not on file.     Social History Main Topics     Smoking status: Former Smoker     Packs/day: 1.00     Years: 15.00     Types: Cigarettes     Quit date: 12/10/2005     Smokeless tobacco: Never Used     Alcohol use 2.0 oz/week     4 Standard drinks or equivalent per week      Comment: occasionally     Drug use: No     Sexual activity: Yes     Partners: Female     Birth control/ protection: Condom     Other Topics Concern     Parent/Sibling W/ Cabg, Mi Or Angioplasty Before 65f 55m? No     Social History Narrative       Current Outpatient Prescriptions   Medication     omeprazole (PRILOSEC) 20 MG capsule     No current facility-administered medications for this visit.      Allergies   Allergen Reactions     Codeine Nausea and Vomiting         Objective:  BP (!) 135/100  Ht 5' 9.69\" (1.77 m)  Wt 215 lb (97.5 kg)  BMI 31.12 kg/m2    General: Alert and in no distress "    Head: Normocephalic, atraumatic  Eyes: no scleral icterus or conjunctival erythema   Oropharynx:  Mucous membranes moist  Skin: no erythema, ecchymosis, petechiae, or jaundice  CV: regular rhythm by palpation, 2+ distal pulses  Resp: normal respiratory effort without conversational dyspnea   Psych: normal mood and affect    Gait: Non-antalgic, appropriate coordination and balance   Neuro: Motor strength and sensation as noted below    Musculoskeletal:    Bilateral Wrist and Hand exam    Inspection:       No swelling, bruising or deformity bilaterally    Palpation:  No tenderness to palpation of the wrists, hands, or digits    ROM:       Full and symmetric active and passive range of motion of the forearm, wrist and digits bilaterally    Strength:       5/5 strength in the muscles of the hand, wrist and forearm bilaterally    Neurovascular:       2+ radial pulses bilaterally with brisk capillary refill and normal sensation to light touch in the radial, median and ulnar nerve distributions    Radiology:  Independent visualization of images performed and reviewed with patient.    Recent Results (from the past 744 hour(s))   CT Wrist Right w/o Contrast    Narrative    CT RIGHT WRIST WITHOUT CONTRAST  8/22/2017 9:22 AM     HISTORY: Six-week followup to assess healing of scaphoid fracture.  Nondisplaced fracture of middle third of navicular (scaphoid) bone of  right wrist, initial encounter for closed fracture.    TECHNIQUE:  Radiation dose for this scan was reduced using automated  exposure control, adjustment of the mA and/or kV according to patient  size, or iterative reconstruction technique.    FINDINGS:  There are ill-defined regions of rarefaction involving the  scaphoid waist and dorsal aspect of the lunate. No bela fracture line  is visible. There is no other evidence of osseous destruction. The  radiocarpal, midcarpal, scaphoid trapezium trapezoid, and carpal  metacarpal joint space widths appear within  normal limits. Joint  alignment is grossly normal. Alignment at the distal radioulnar joint  also appears within normal limits.      Impression    IMPRESSION:  1. No visible scaphoid fracture line. Scaphoid fracture on 7/10/2017  radiographs is presumably healed.  2. Ill-defined regions of rarefaction or lucency involving the  scaphoid waist and dorsal aspect of the lunate. This could represent  patchy demineralization due to disuse osteopenia but is of  indeterminate etiology or significance. Adjacent joint space widths  appear within normal limits. Followup MRI might be of use,  particularly if there is continued wrist pain.    EDDY LIM MD       Assessment:  1. Closed nondisplaced fracture of middle third of scaphoid of right wrist with routine healing, subsequent encounter        Plan:  Discussed the assessment with the patient and developed a plan together:  -Return to work without restrictions. Letter provided.  Informed him that he may have some stiffness and soreness; however, significant pain would warrant repeat examination.    -Home Exercises. Please do 5-6 days of exercises per week   -Discontinue use of the brace  -Ice 15-20 minutes for pain relief or swelling as needed  -Ibuprofen (Advil) maximum of 800mg every 4-6 hours with food as needed for pain.    Follow Up: As needed if pain returns. Call with any questions or concerns.     -Follow up with primary care provider regarding increased blood pressure.  First measurement was 145/101, second was 135/100.        Zohra Baugh MD, CAQ  [facility]

## 2018-03-14 ENCOUNTER — TELEPHONE (OUTPATIENT)
Dept: FAMILY MEDICINE | Facility: CLINIC | Age: 38
End: 2018-03-14

## 2018-03-14 ENCOUNTER — OFFICE VISIT (OUTPATIENT)
Dept: FAMILY MEDICINE | Facility: CLINIC | Age: 38
End: 2018-03-14
Payer: COMMERCIAL

## 2018-03-14 ENCOUNTER — HOSPITAL ENCOUNTER (OUTPATIENT)
Dept: ULTRASOUND IMAGING | Facility: CLINIC | Age: 38
Discharge: HOME | End: 2018-03-14
Attending: FAMILY MEDICINE | Admitting: FAMILY MEDICINE
Payer: COMMERCIAL

## 2018-03-14 VITALS
SYSTOLIC BLOOD PRESSURE: 114 MMHG | HEIGHT: 70 IN | TEMPERATURE: 99.9 F | WEIGHT: 215 LBS | BODY MASS INDEX: 30.78 KG/M2 | OXYGEN SATURATION: 95 % | DIASTOLIC BLOOD PRESSURE: 76 MMHG | HEART RATE: 120 BPM

## 2018-03-14 DIAGNOSIS — L03.115 CELLULITIS OF RIGHT LOWER EXTREMITY: Primary | ICD-10-CM

## 2018-03-14 DIAGNOSIS — M79.651 PAIN OF RIGHT THIGH: ICD-10-CM

## 2018-03-14 LAB
BASOPHILS # BLD AUTO: 0.1 10E9/L (ref 0–0.2)
BASOPHILS NFR BLD AUTO: 0.3 %
DIFFERENTIAL METHOD BLD: ABNORMAL
EOSINOPHIL # BLD AUTO: 0.1 10E9/L (ref 0–0.7)
EOSINOPHIL NFR BLD AUTO: 0.6 %
ERYTHROCYTE [DISTWIDTH] IN BLOOD BY AUTOMATED COUNT: 12.9 % (ref 10–15)
HCT VFR BLD AUTO: 47.4 % (ref 40–53)
HGB BLD-MCNC: 16.2 G/DL (ref 13.3–17.7)
LYMPHOCYTES # BLD AUTO: 1.6 10E9/L (ref 0.8–5.3)
LYMPHOCYTES NFR BLD AUTO: 9.4 %
MCH RBC QN AUTO: 28.5 PG (ref 26.5–33)
MCHC RBC AUTO-ENTMCNC: 34.2 G/DL (ref 31.5–36.5)
MCV RBC AUTO: 83 FL (ref 78–100)
MONOCYTES # BLD AUTO: 1.3 10E9/L (ref 0–1.3)
MONOCYTES NFR BLD AUTO: 7.8 %
NEUTROPHILS # BLD AUTO: 14.1 10E9/L (ref 1.6–8.3)
NEUTROPHILS NFR BLD AUTO: 81.9 %
PLATELET # BLD AUTO: 263 10E9/L (ref 150–450)
RBC # BLD AUTO: 5.69 10E12/L (ref 4.4–5.9)
WBC # BLD AUTO: 17.2 10E9/L (ref 4–11)

## 2018-03-14 PROCEDURE — 36415 COLL VENOUS BLD VENIPUNCTURE: CPT | Performed by: FAMILY MEDICINE

## 2018-03-14 PROCEDURE — 93971 EXTREMITY STUDY: CPT | Mod: RT

## 2018-03-14 PROCEDURE — 99214 OFFICE O/P EST MOD 30 MIN: CPT | Performed by: FAMILY MEDICINE

## 2018-03-14 PROCEDURE — 85025 COMPLETE CBC W/AUTO DIFF WBC: CPT | Performed by: FAMILY MEDICINE

## 2018-03-14 RX ORDER — CLINDAMYCIN HCL 300 MG
300 CAPSULE ORAL 4 TIMES DAILY
Qty: 40 CAPSULE | Refills: 0 | Status: SHIPPED | OUTPATIENT
Start: 2018-03-14 | End: 2018-03-28

## 2018-03-14 NOTE — PATIENT INSTRUCTIONS
You do have a skin infection.    No signs of any blood clot.    You do have a white blood cell count that is elevated and needs to be followed up.    Follow up in 10 days.    Results for orders placed or performed in visit on 03/14/18   US Lower Extremity Venous Duplex Right    Narrative    VENOUS ULTRASOUND RIGHT LOWER EXTREMITY March 14, 2018 12:12 PM     HISTORY: Right thigh pain, has ridge and redness anterior thigh, about  15-20 mg up to groin, rule out blood clot. Pain of right thigh.    COMPARISON: None.    TECHNIQUE: Color Doppler and spectral waveform analysis performed  throughout the deep veins of the right lower extremity.    FINDINGS: The right common femoral, proximal greater saphenous,  femoral, and popliteal veins demonstrate normal blood flow,  compression, and augmentation. Posterior tibial veins are  compressible. No ultrasound abnormality underlying area of redness.  Hypoechoic collection is noted in the subcutaneous tissue measuring  1.4 x 1.3 x 0.9 cm with internal blood flow, likely a lymph node. Left  common femoral vein is patent.      Impression    IMPRESSION:  1. Negative for deep venous thrombosis in the right lower extremity.  2. Scattered subcutaneous lymph nodes, largest of which is upper limit  of normal in size measuring 1.3 x 0.9 x 1.4 cm. These may be reactive,  though otherwise nonspecific.  3. No specific ultrasound abnormality underlying area of redness.    ANY LIU MD   CBC with platelets differential   Result Value Ref Range    WBC 17.2 (H) 4.0 - 11.0 10e9/L    RBC Count 5.69 4.4 - 5.9 10e12/L    Hemoglobin 16.2 13.3 - 17.7 g/dL    Hematocrit 47.4 40.0 - 53.0 %    MCV 83 78 - 100 fl    MCH 28.5 26.5 - 33.0 pg    MCHC 34.2 31.5 - 36.5 g/dL    RDW 12.9 10.0 - 15.0 %    Platelet Count 263 150 - 450 10e9/L    Diff Method Automated Method     % Neutrophils 81.9 %    % Lymphocytes 9.4 %    % Monocytes 7.8 %    % Eosinophils 0.6 %    % Basophils 0.3 %    Absolute Neutrophil  14.1 (H) 1.6 - 8.3 10e9/L    Absolute Lymphocytes 1.6 0.8 - 5.3 10e9/L    Absolute Monocytes 1.3 0.0 - 1.3 10e9/L    Absolute Eosinophils 0.1 0.0 - 0.7 10e9/L    Absolute Basophils 0.1 0.0 - 0.2 10e9/L           Thank you for choosing [facility].  You may be receiving a survey in the mail from [facility] regarding your visit today.  Please take a few minutes to complete and return the survey to let us know how we are doing.      If you have questions or concerns, please contact us via [employer]Feed or you can contact your care team at [phone].    Our Clinic hours are:  Monday 6:40 am  to 7:00 pm  Tuesday -Friday 6:40 am to 5:00 pm    The [place] outpatient lab hours are:  Monday - Friday 6:10 am to 4:45 pm  Saturdays 7:00 am to 11:00 am  Appointments are required, call [phone]    If you have clinical questions after hours or would like to schedule an appointment,  call the clinic at [phone].

## 2018-03-14 NOTE — NURSING NOTE
"Chief Complaint   Patient presents with     Musculoskeletal Problem     Right Leg        Initial /76 (BP Location: Left arm, Patient Position: Chair, Cuff Size: Adult Large)  Pulse 120  Temp 99.9  F (37.7  C) (Tympanic)  Ht 5' 10.47\" (1.79 m)  Wt 215 lb (97.5 kg)  SpO2 95%  BMI 30.44 kg/m2 Estimated body mass index is 30.44 kg/(m^2) as calculated from the following:    Height as of this encounter: 5' 10.47\" (1.79 m).    Weight as of this encounter: 215 lb (97.5 kg).  Medication Reconciliation: complete   Karishma Felix CMA (AAMA)   (aka: Odalys Felix)      "

## 2018-03-14 NOTE — PROGRESS NOTES
SUBJECTIVE:   Michael Arreola is a 37 year old male who presents to clinic today for the following health issues:      Joint Pain    Onset: Past Sunday Morning    Description:   Location: Right Leg.  Character: Sharp, Dull ache    Intensity: mild, severe, 4/10 right now, 8/10 at worst    Progression of Symptoms: worse    Accompanying Signs & Symptoms:  Other symptoms: warmth, swelling and discoloration of skin on the leg. Pt also verbalize that he has feeling very tired in the past couple days.     History:   Previous similar pain: no       Precipitating factors:   Trauma or overuse: YES- Pt went for a run and after that he notice a lump in his right leg with a small pain, than in Tuesday evening become very swollen and red and now when pt stands he feels a pressure in the spot. Pt concerns about blood clots.    Alleviating factors:  Improved by: nothing    Therapies Tried and outcome: Tylenol PM (OTC) Last night with minor effect    Patient states he was running when he noted the pain in right anterior thigh.  The redness started showing up yesterday.  No fever or chill.  There is some increase in pain.  No recent travel.  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  He denies sitting for long periods of time.  No family h/o blood clot issues.  No chest pain pressure or tightness.            Problem list and histories reviewed & adjusted, as indicated.  Additional history: as documented        Reviewed and updated as needed this visit by clinical staff  Tobacco  Allergies  Meds  Med Hx  Surg Hx  Fam Hx  Soc Hx      Reviewed and updated as needed this visit by Provider         ROS:  CONSTITUTIONAL:NEGATIVE for fever, chills, change in weight  INTEGUMENTARY/SKIN: as above  RESP:NEGATIVE for significant cough or SOB  CV: NEGATIVE for chest pain, palpitations or peripheral edema  MUSCULOSKELETAL: as above  NEURO: NEGATIVE for weakness, dizziness or paresthesias  HEME/ALLERGY/IMMUNE: as  "above  PSYCHIATRIC: NEGATIVE for changes in mood or affect    OBJECTIVE:                                                    /76 (BP Location: Left arm, Patient Position: Chair, Cuff Size: Adult Large)  Pulse 120  Temp 99.9  F (37.7  C) (Tympanic)  Ht 5' 10.47\" (1.79 m)  Wt 215 lb (97.5 kg)  SpO2 95%  BMI 30.44 kg/m2  Body mass index is 30.44 kg/(m^2).  GENERAL APPEARANCE: healthy, alert and no distress  RESP: lungs clear to auscultation - no rales, rhonchi or wheezes  CV: regular rates and rhythm, normal S1 S2, no S3 or S4 and no murmur, click or rub  MS: extremities normal- no gross deformities noted  SKIN: noted on right anterior thigh, redness about 2.5 cm going from distal 1/3 to his groin, induration verses a superficial thrombophlembitis.  NEURO: Normal strength and tone, mentation intact and speech normal  PSYCH: mentation appears normal and affect normal/bright    Results for orders placed or performed in visit on 03/14/18   US Lower Extremity Venous Duplex Right    Narrative    VENOUS ULTRASOUND RIGHT LOWER EXTREMITY March 14, 2018 12:12 PM     HISTORY: Right thigh pain, has ridge and redness anterior thigh, about  15-20 mg up to groin, rule out blood clot. Pain of right thigh.    COMPARISON: None.    TECHNIQUE: Color Doppler and spectral waveform analysis performed  throughout the deep veins of the right lower extremity.    FINDINGS: The right common femoral, proximal greater saphenous,  femoral, and popliteal veins demonstrate normal blood flow,  compression, and augmentation. Posterior tibial veins are  compressible. No ultrasound abnormality underlying area of redness.  Hypoechoic collection is noted in the subcutaneous tissue measuring  1.4 x 1.3 x 0.9 cm with internal blood flow, likely a lymph node. Left  common femoral vein is patent.      Impression    IMPRESSION:  1. Negative for deep venous thrombosis in the right lower extremity.  2. Scattered subcutaneous lymph nodes, largest of " which is upper limit  of normal in size measuring 1.3 x 0.9 x 1.4 cm. These may be reactive,  though otherwise nonspecific.  3. No specific ultrasound abnormality underlying area of redness.    ANY LIU MD   CBC with platelets differential   Result Value Ref Range    WBC 17.2 (H) 4.0 - 11.0 10e9/L    RBC Count 5.69 4.4 - 5.9 10e12/L    Hemoglobin 16.2 13.3 - 17.7 g/dL    Hematocrit 47.4 40.0 - 53.0 %    MCV 83 78 - 100 fl    MCH 28.5 26.5 - 33.0 pg    MCHC 34.2 31.5 - 36.5 g/dL    RDW 12.9 10.0 - 15.0 %    Platelet Count 263 150 - 450 10e9/L    Diff Method Automated Method     % Neutrophils 81.9 %    % Lymphocytes 9.4 %    % Monocytes 7.8 %    % Eosinophils 0.6 %    % Basophils 0.3 %    Absolute Neutrophil 14.1 (H) 1.6 - 8.3 10e9/L    Absolute Lymphocytes 1.6 0.8 - 5.3 10e9/L    Absolute Monocytes 1.3 0.0 - 1.3 10e9/L    Absolute Eosinophils 0.1 0.0 - 0.7 10e9/L    Absolute Basophils 0.1 0.0 - 0.2 10e9/L          ASSESSMENT/PLAN:                                                    1. Cellulitis of right lower extremity  Will treat with abx, covering for resistant staph knowing his history of being on wrestling mats this past weekend  - clindamycin (CLEOCIN) 300 MG capsule; Take 1 capsule (300 mg) by mouth 4 times daily  Dispense: 40 capsule; Refill: 0    2. Pain of right thigh  Due to cellulitis  - CBC with platelets differential  - US Lower Extremity Venous Duplex Right  - clindamycin (CLEOCIN) 300 MG capsule; Take 1 capsule (300 mg) by mouth 4 times daily  Dispense: 40 capsule; Refill: 0      See Patient Instructions  Counseling/Coordination of care is over 15 min in 25 min appt.  Follow up for recheck of CBC too.    Derian Hwang MD  [facility]

## 2018-03-14 NOTE — TELEPHONE ENCOUNTER
"S;  made him an appt this am and then asked me to call back to triage.   B: reached him on home phone.   \"slight swelling at leg where the red marks are. \"  Runs along groin to above knee cap.   No chest pain, not short of breath.  Not coughing up blood or pink.   Foot/ leg not cold or blue.   No numbness or tingling in leg.     \"it is painful to walk on the leg\"     Pain initially felt like golf ball in muscle, and noticed swelling in muscle, and red sacha started last night about 10 pm.       A; Per Per telephone triage protocols for nurses, fifth edition, [place], leg pain/ swelling, ok to be seen within 2-4 hours.   He has 1020 am appt here in clinic.   R; will keep the appt this morning, and advised ED immed if he develops chest pain, coughing up bloody/ pink sputum, short of breath, cold or blue foot or leg, numbness or tingling. \"ok, will do, thanks, \"    Giulia Muller RNC      "

## 2018-03-14 NOTE — MR AVS SNAPSHOT
After Visit Summary   3/14/2018    Michael Arreola    MRN: [id number]           Patient Information     Date Of Birth          1980        Visit Information        Provider Department      3/14/2018 10:20 AM Derian Hwang MD [facility]        Today's Diagnoses     Cellulitis of right lower extremity    -  1    Pain of right thigh          Care Instructions    You do have a skin infection.    No signs of any blood clot.    You do have a white blood cell count that is elevated and needs to be followed up.    Follow up in 10 days.    Results for orders placed or performed in visit on 03/14/18   US Lower Extremity Venous Duplex Right    Narrative    VENOUS ULTRASOUND RIGHT LOWER EXTREMITY March 14, 2018 12:12 PM     HISTORY: Right thigh pain, has ridge and redness anterior thigh, about  15-20 mg up to groin, rule out blood clot. Pain of right thigh.    COMPARISON: None.    TECHNIQUE: Color Doppler and spectral waveform analysis performed  throughout the deep veins of the right lower extremity.    FINDINGS: The right common femoral, proximal greater saphenous,  femoral, and popliteal veins demonstrate normal blood flow,  compression, and augmentation. Posterior tibial veins are  compressible. No ultrasound abnormality underlying area of redness.  Hypoechoic collection is noted in the subcutaneous tissue measuring  1.4 x 1.3 x 0.9 cm with internal blood flow, likely a lymph node. Left  common femoral vein is patent.      Impression    IMPRESSION:  1. Negative for deep venous thrombosis in the right lower extremity.  2. Scattered subcutaneous lymph nodes, largest of which is upper limit  of normal in size measuring 1.3 x 0.9 x 1.4 cm. These may be reactive,  though otherwise nonspecific.  3. No specific ultrasound abnormality underlying area of redness.    ANY LIU MD   CBC with platelets differential   Result Value Ref Range    WBC 17.2 (H) 4.0 - 11.0 10e9/L    RBC Count 5.69 4.4  - 5.9 10e12/L    Hemoglobin 16.2 13.3 - 17.7 g/dL    Hematocrit 47.4 40.0 - 53.0 %    MCV 83 78 - 100 fl    MCH 28.5 26.5 - 33.0 pg    MCHC 34.2 31.5 - 36.5 g/dL    RDW 12.9 10.0 - 15.0 %    Platelet Count 263 150 - 450 10e9/L    Diff Method Automated Method     % Neutrophils 81.9 %    % Lymphocytes 9.4 %    % Monocytes 7.8 %    % Eosinophils 0.6 %    % Basophils 0.3 %    Absolute Neutrophil 14.1 (H) 1.6 - 8.3 10e9/L    Absolute Lymphocytes 1.6 0.8 - 5.3 10e9/L    Absolute Monocytes 1.3 0.0 - 1.3 10e9/L    Absolute Eosinophils 0.1 0.0 - 0.7 10e9/L    Absolute Basophils 0.1 0.0 - 0.2 10e9/L           Thank you for choosing [facility].  You may be receiving a survey in the mail from [employer] regarding your visit today.  Please take a few minutes to complete and return the survey to let us know how we are doing.      If you have questions or concerns, please contact us via [employer] or you can contact your care team at [phone].    Our Clinic hours are:  Monday 6:40 am  to 7:00 pm  Tuesday -Friday 6:40 am to 5:00 pm    The [place] outpatient lab hours are:  Monday - Friday 6:10 am to 4:45 pm  Saturdays 7:00 am to 11:00 am  Appointments are required, call [phone]    If you have clinical questions after hours or would like to schedule an appointment,  call the clinic at [phone].            Follow-ups after your visit        Who to contact     If you have questions or need follow up information about today's clinic visit or your schedule please contact [facility] directly at [phone].  Normal or non-critical lab and imaging results will be communicated to you by MyChart, letter or phone within 4 business days after the clinic has received the results. If you do not hear from us within 7 days, please contact the clinic through [employer] or phone. If you have a critical or abnormal lab result, we will notify you by phone as soon as possible.  Submit refill requests through [employer] or  "call your pharmacy and they will forward the refill request to us. Please allow 3 business days for your refill to be completed.          Additional Information About Your Visit        MyChart Information     [employer] Inchart lets you send messages to your doctor, view your test results, renew your prescriptions, schedule appointments and more. To sign up, go to [email or web address] . Click on \"Log in\" on the left side of the screen, which will take you to the Welcome page. Then click on \"Sign up Now\" on the right side of the page.     You will be asked to enter the access code listed below, as well as some personal information. Please follow the directions to create your username and password.     Your access code is: [email or web address]  Expires: 2018 10:22 AM     Your access code will  in 90 days. If you need help or a new code, please call your [facility] clinic or [phone].        Care EveryWhere ID     This is your Care EveryWhere ID. This could be used by other organizations to access your [facility] medical records  [id number]        Your Vitals Were     Pulse Temperature Height Pulse Oximetry BMI (Body Mass Index)       120 99.9  F (37.7  C) (Tympanic) 5' 10.47\" (1.79 m) 95% 30.44 kg/m2        Blood Pressure from Last 3 Encounters:   18 114/76   17 (!) 135/100   17 120/86    Weight from Last 3 Encounters:   18 215 lb (97.5 kg)   17 215 lb (97.5 kg)   17 215 lb (97.5 kg)              We Performed the Following     CBC with platelets differential     US Lower Extremity Venous Duplex Right          Today's Medication Changes          These changes are accurate as of 3/14/18 12:35 PM.  If you have any questions, ask your nurse or doctor.               Start taking these medicines.        Dose/Directions    clindamycin 300 MG capsule   Commonly known as:  CLEOCIN   Used for:  Pain of right thigh, Cellulitis of right lower extremity   Started by:  Derian Hwang MD        " Dose:  300 mg   Take 1 capsule (300 mg) by mouth 4 times daily   Quantity:  40 capsule   Refills:  0            Where to get your medicines      These medications were sent to [facility] [place] - [place], [place] - [address]  [address] [address]     Phone:  [phone]     clindamycin 300 MG capsule                Primary Care Provider Fax #    Physician No Ref-Primary [phone]       No address on file        Equal Access to Services     TONNY ALEJANDRA : Hadii aad ku hadasho Soomaali, waaxda luqadaha, qaybta kaalmada adeegyada, waxay idiin hayaan adeeg khvaldo hardy. So [facility] [phone].    ATENCIÓN: Si habla español, tiene a reaves disposición servicios gratuitos de asistencia lingüística. Merryame al [phone].    We comply with applicable federal civil rights laws and [place] laws. We do not discriminate on the basis of race, color, national origin, age, disability, sex, sexual orientation, or gender identity.            Thank you!     Thank you for choosing [facility]  for your care. Our goal is always to provide you with excellent care. Hearing back from our patients is one way we can continue to improve our services. Please take a few minutes to complete the written survey that you may receive in the mail after your visit with us. Thank you!             Your Updated Medication List - Protect others around you: Learn how to safely use, store and throw away your medicines at www.disposemymeds.org.          This list is accurate as of 3/14/18 12:35 PM.  Always use your most recent med list.                   Brand Name Dispense Instructions for use Diagnosis    clindamycin 300 MG capsule    CLEOCIN    40 capsule    Take 1 capsule (300 mg) by mouth 4 times daily    Pain of right thigh, Cellulitis of right lower extremity       omeprazole 20 MG CR capsule    priLOSEC    30 capsule    Take 1 capsule (20 mg) by mouth daily    GERD (gastroesophageal reflux disease)

## 2018-03-28 ENCOUNTER — OFFICE VISIT (OUTPATIENT)
Dept: FAMILY MEDICINE | Facility: CLINIC | Age: 38
End: 2018-03-28
Payer: COMMERCIAL

## 2018-03-28 VITALS
BODY MASS INDEX: 30.78 KG/M2 | WEIGHT: 215 LBS | SYSTOLIC BLOOD PRESSURE: 128 MMHG | HEIGHT: 70 IN | HEART RATE: 72 BPM | DIASTOLIC BLOOD PRESSURE: 88 MMHG | TEMPERATURE: 98.9 F

## 2018-03-28 DIAGNOSIS — D72.829 LEUKOCYTOSIS, UNSPECIFIED TYPE: ICD-10-CM

## 2018-03-28 DIAGNOSIS — L03.115 CELLULITIS OF RIGHT LOWER EXTREMITY: Primary | ICD-10-CM

## 2018-03-28 LAB
BASOPHILS # BLD AUTO: 0.1 10E9/L (ref 0–0.2)
BASOPHILS NFR BLD AUTO: 0.5 %
DIFFERENTIAL METHOD BLD: ABNORMAL
EOSINOPHIL # BLD AUTO: 0.2 10E9/L (ref 0–0.7)
EOSINOPHIL NFR BLD AUTO: 1.4 %
ERYTHROCYTE [DISTWIDTH] IN BLOOD BY AUTOMATED COUNT: 13 % (ref 10–15)
HCT VFR BLD AUTO: 42.9 % (ref 40–53)
HGB BLD-MCNC: 14.7 G/DL (ref 13.3–17.7)
LYMPHOCYTES # BLD AUTO: 4.3 10E9/L (ref 0.8–5.3)
LYMPHOCYTES NFR BLD AUTO: 31.6 %
MCH RBC QN AUTO: 28.4 PG (ref 26.5–33)
MCHC RBC AUTO-ENTMCNC: 34.3 G/DL (ref 31.5–36.5)
MCV RBC AUTO: 83 FL (ref 78–100)
MONOCYTES # BLD AUTO: 1.1 10E9/L (ref 0–1.3)
MONOCYTES NFR BLD AUTO: 8.4 %
NEUTROPHILS # BLD AUTO: 7.8 10E9/L (ref 1.6–8.3)
NEUTROPHILS NFR BLD AUTO: 58.1 %
PLATELET # BLD AUTO: 308 10E9/L (ref 150–450)
RBC # BLD AUTO: 5.17 10E12/L (ref 4.4–5.9)
WBC # BLD AUTO: 13.5 10E9/L (ref 4–11)

## 2018-03-28 PROCEDURE — 99213 OFFICE O/P EST LOW 20 MIN: CPT | Performed by: FAMILY MEDICINE

## 2018-03-28 PROCEDURE — 85025 COMPLETE CBC W/AUTO DIFF WBC: CPT | Performed by: FAMILY MEDICINE

## 2018-03-28 PROCEDURE — 36415 COLL VENOUS BLD VENIPUNCTURE: CPT | Performed by: FAMILY MEDICINE

## 2018-03-28 NOTE — MR AVS SNAPSHOT
After Visit Summary   3/28/2018    Michael Arreola    MRN: [id number]           Patient Information     Date Of Birth          1980        Visit Information        Provider Department      3/28/2018 4:20 PM Derian Hwang MD [facility]        Today's Diagnoses     Cellulitis of right lower extremity    -  1      Care Instructions    You are doing well.    Need to follow up your white blood cell count.    Please stop at lab.        Thank you for choosing [facility].  You may be receiving a survey in the mail from Genna Diehl regarding your visit today.  Please take a few minutes to complete and return the survey to let us know how we are doing.      If you have questions or concerns, please contact us via [employer] or you can contact your care team at [phone].    Our Clinic hours are:  Monday 6:40 am  to 7:00 pm  Tuesday -Friday 6:40 am to 5:00 pm    The [place] outpatient lab hours are:  Monday - Friday 6:10 am to 4:45 pm  Saturdays 7:00 am to 11:00 am  Appointments are required, call [phone]    If you have clinical questions after hours or would like to schedule an appointment,  call the clinic at [phone].            Follow-ups after your visit        Who to contact     If you have questions or need follow up information about today's clinic visit or your schedule please contact [facility] directly at [phone].  Normal or non-critical lab and imaging results will be communicated to you by [employer], letter or phone within 4 business days after the clinic has received the results. If you do not hear from us within 7 days, please contact the clinic through [employer] or phone. If you have a critical or abnormal lab result, we will notify you by phone as soon as possible.  Submit refill requests through [employer] or call your pharmacy and they will forward the refill request to us. Please allow 3 business days for your refill to be completed.           "Additional Information About Your Visit        MyChart Information     [employer] lets you send messages to your doctor, view your test results, renew your prescriptions, schedule appointments and more. To sign up, go to [email or web address] . Click on \"Log in\" on the left side of the screen, which will take you to the Welcome page. Then click on \"Sign up Now\" on the right side of the page.     You will be asked to enter the access code listed below, as well as some personal information. Please follow the directions to create your username and password.     Your access code is: [email or web address]  Expires: 2018 10:22 AM     Your access code will  in 90 days. If you need help or a new code, please call your [facility] clinic or [phone].        Care EveryWhere ID     This is your Care EveryWhere ID. This could be used by other organizations to access your [facility] medical records  [id number]        Your Vitals Were     Pulse Temperature Height BMI (Body Mass Index)          72 98.9  F (37.2  C) (Tympanic) 5' 10\" (1.778 m) 30.85 kg/m2         Blood Pressure from Last 3 Encounters:   18 128/88   18 114/76   17 (!) 135/100    Weight from Last 3 Encounters:   18 215 lb (97.5 kg)   18 215 lb (97.5 kg)   17 215 lb (97.5 kg)              We Performed the Following     CBC with platelets differential        Primary Care Provider Fax #    Physician No Ref-Primary [phone]       No address on file        Equal Access to Services     [facility] Center[facility]MY : Anthony Clark, waeladio gregg, qarafat quezadaaljohnna aranda. So [facility] [phone].    ATENCIÓN: Si habla español, tiene a reaves disposición servicios gratuitos de asistencia lingüística. Llame al [phone].    We comply with applicable federal civil rights laws and [place] laws. We do not discriminate on the basis of race, color, national origin, age, disability, sex, sexual " orientation, or gender identity.            Thank you!     Thank you for choosing [facility]  for your care. Our goal is always to provide you with excellent care. Hearing back from our patients is one way we can continue to improve our services. Please take a few minutes to complete the written survey that you may receive in the mail after your visit with us. Thank you!             Your Updated Medication List - Protect others around you: Learn how to safely use, store and throw away your medicines at www.disposemymeds.org.          This list is accurate as of 3/28/18  4:28 PM.  Always use your most recent med list.                   Brand Name Dispense Instructions for use Diagnosis    omeprazole 20 MG CR capsule    priLOSEC    30 capsule    Take 1 capsule (20 mg) by mouth daily    GERD (gastroesophageal reflux disease)

## 2018-03-28 NOTE — PROGRESS NOTES
"  SUBJECTIVE:   Michael Arreola is a 37 year old male who presents to clinic today for the following health issues:  Chief Complaint   Patient presents with     Follow Up For     right leg cellulitis. Previously seen 3/14/2018. Has improved, no more pain. Only one small receding lump.     Patient is here to follow up regarding his right lower extremity cellulitis.  He took the abx.  Only side effect was diarrhea, resolved after he stopped it.  He is feeling fine.  Only slight residual lump in one area.  Needs to have wbc followed up            Problem list and histories reviewed & adjusted, as indicated.  Additional history:         Reviewed and updated as needed this visit by clinical staff  Allergies       Reviewed and updated as needed this visit by Provider         ROS:  CONSTITUTIONAL:NEGATIVE for fever, chills, change in weight  INTEGUMENTARY/SKIN: resolved  MUSCULOSKELETAL: NEGATIVE for significant arthralgias or myalgia    OBJECTIVE:                                                    /88  Pulse 72  Temp 98.9  F (37.2  C) (Tympanic)  Ht 5' 10\" (1.778 m)  Wt 215 lb (97.5 kg)  BMI 30.85 kg/m2  Body mass index is 30.85 kg/(m^2).  GENERAL APPEARANCE: healthy, alert and no distress  MS: normal right lower extremity  SKIN: only slight area of firmer skin no redness, about he size of 1.5 cm, no fluctuation.      NEURO: Normal strength and tone, mentation intact and speech normal  PSYCH: mentation appears normal and affect normal/bright         ASSESSMENT/PLAN:                                                    1. Cellulitis of right lower extremity  Resolved needs to recheck lab  - CBC with platelets differential  - CBC with platelets differential; Future    2. Leukocytosis, unspecified type  Noted still elevated but no neutrophilia, will recheck in 2 weeks.  - CBC with platelets differential; Future      See Patient Instructions  Counseling/Coordination of care is over 10 min in 15 min appt.      Derian CARABALLO " MD [place]  [facility]

## 2018-03-28 NOTE — NURSING NOTE
"Chief Complaint   Patient presents with     Follow Up For     right leg cellulitis. Previously seen 3/14/2018. Has improved, no more pain. Only one small receding lump.       Initial /88  Pulse 72  Temp 98.9  F (37.2  C) (Tympanic)  Ht 5' 10\" (1.778 m)  Wt 215 lb (97.5 kg)  BMI 30.85 kg/m2 Estimated body mass index is 30.85 kg/(m^2) as calculated from the following:    Height as of this encounter: 5' 10\" (1.778 m).    Weight as of this encounter: 215 lb (97.5 kg).  Medication Reconciliation: complete  "

## 2018-03-28 NOTE — PATIENT INSTRUCTIONS
You are doing well.    Need to follow up your white blood cell count.    Please stop at lab.        Thank you for choosing [facility].  You may be receiving a survey in the mail from Genna Diehl regarding your visit today.  Please take a few minutes to complete and return the survey to let us know how we are doing.      If you have questions or concerns, please contact us via [employer] or you can contact your care team at [phone].    Our Clinic hours are:  Monday 6:40 am  to 7:00 pm  Tuesday -Friday 6:40 am to 5:00 pm    The [place] outpatient lab hours are:  Monday - Friday 6:10 am to 4:45 pm  Saturdays 7:00 am to 11:00 am  Appointments are required, call [phone]    If you have clinical questions after hours or would like to schedule an appointment,  call the clinic at [phone].

## 2018-03-28 NOTE — LETTER
[facility]  [address] [address]  [phone]        May 4, 2018    Michael Arreola  [facility] [place] [phone]              Dear Michael Arreola    This is to remind you that your CBC Panel lab is due.    You may call our office at [phone] to schedule an appointment.    Please disregard this notice if you have already had your labs drawn or made an appointment.        Sincerely,        Derian Hwang MD

## 2018-06-09 ENCOUNTER — TELEPHONE (OUTPATIENT)
Dept: FAMILY MEDICINE | Facility: CLINIC | Age: 38
End: 2018-06-09

## 2018-06-09 NOTE — TELEPHONE ENCOUNTER
We sent this patient an overdue lab letter on 5/6/18. If they need these results please contact the patient and place new orders.  Thank you  Op lab